# Patient Record
Sex: FEMALE | Race: WHITE | Employment: FULL TIME | URBAN - METROPOLITAN AREA
[De-identification: names, ages, dates, MRNs, and addresses within clinical notes are randomized per-mention and may not be internally consistent; named-entity substitution may affect disease eponyms.]

---

## 2019-01-23 ENCOUNTER — HOSPITAL ENCOUNTER (EMERGENCY)
Facility: CLINIC | Age: 29
Discharge: HOME | End: 2019-01-24
Attending: EMERGENCY MEDICINE | Admitting: EMERGENCY MEDICINE
Payer: COMMERCIAL

## 2019-01-23 DIAGNOSIS — O03.9 INEVITABLE COMPLETE MISCARRIAGE WITHOUT COMPLICATION: ICD-10-CM

## 2019-01-23 LAB
BASOPHILS # BLD AUTO: 0 10E9/L (ref 0–0.2)
BASOPHILS NFR BLD AUTO: 0.2 %
DIFFERENTIAL METHOD BLD: ABNORMAL
EOSINOPHIL # BLD AUTO: 0.2 10E9/L (ref 0–0.7)
EOSINOPHIL NFR BLD AUTO: 1.5 %
ERYTHROCYTE [DISTWIDTH] IN BLOOD BY AUTOMATED COUNT: 12.7 % (ref 10–15)
HCT VFR BLD AUTO: 33 % (ref 35–47)
HGB BLD-MCNC: 11.3 G/DL (ref 11.7–15.7)
IMM GRANULOCYTES # BLD: 0 10E9/L (ref 0–0.4)
IMM GRANULOCYTES NFR BLD: 0.4 %
LYMPHOCYTES # BLD AUTO: 2.7 10E9/L (ref 0.8–5.3)
LYMPHOCYTES NFR BLD AUTO: 25.1 %
MCH RBC QN AUTO: 29.6 PG (ref 26.5–33)
MCHC RBC AUTO-ENTMCNC: 34.2 G/DL (ref 31.5–36.5)
MCV RBC AUTO: 86 FL (ref 78–100)
MONOCYTES # BLD AUTO: 0.7 10E9/L (ref 0–1.3)
MONOCYTES NFR BLD AUTO: 6.7 %
NEUTROPHILS # BLD AUTO: 7.1 10E9/L (ref 1.6–8.3)
NEUTROPHILS NFR BLD AUTO: 66.1 %
NRBC # BLD AUTO: 0 10*3/UL
NRBC BLD AUTO-RTO: 0 /100
PLATELET # BLD AUTO: 173 10E9/L (ref 150–450)
RBC # BLD AUTO: 3.82 10E12/L (ref 3.8–5.2)
WBC # BLD AUTO: 10.8 10E9/L (ref 4–11)

## 2019-01-23 PROCEDURE — 85025 COMPLETE CBC W/AUTO DIFF WBC: CPT | Performed by: EMERGENCY MEDICINE

## 2019-01-23 PROCEDURE — 25000128 H RX IP 250 OP 636: Performed by: EMERGENCY MEDICINE

## 2019-01-23 PROCEDURE — 86850 RBC ANTIBODY SCREEN: CPT | Performed by: EMERGENCY MEDICINE

## 2019-01-23 PROCEDURE — 96360 HYDRATION IV INFUSION INIT: CPT

## 2019-01-23 PROCEDURE — 99284 EMERGENCY DEPT VISIT MOD MDM: CPT | Mod: 25

## 2019-01-23 PROCEDURE — 86901 BLOOD TYPING SEROLOGIC RH(D): CPT | Performed by: EMERGENCY MEDICINE

## 2019-01-23 PROCEDURE — 86900 BLOOD TYPING SEROLOGIC ABO: CPT | Performed by: EMERGENCY MEDICINE

## 2019-01-23 RX ADMIN — SODIUM CHLORIDE 1000 ML: 9 INJECTION, SOLUTION INTRAVENOUS at 23:40

## 2019-01-23 SDOH — HEALTH STABILITY: MENTAL HEALTH: HOW OFTEN DO YOU HAVE A DRINK CONTAINING ALCOHOL?: NEVER

## 2019-01-23 ASSESSMENT — ENCOUNTER SYMPTOMS
FREQUENCY: 0
DYSURIA: 0
LIGHT-HEADEDNESS: 1

## 2019-01-23 ASSESSMENT — MIFFLIN-ST. JEOR: SCORE: 1339.16

## 2019-01-23 NOTE — ED AVS SNAPSHOT
Emergency Department  [address] [place] [address]  Phone:  [phone]  Fax:  [phone]                                    Ulises Meadows   MRN: [id number]    Department:   Emergency Department   Date of Visit:  1/23/2019           After Visit Summary Signature Page    I have received my discharge instructions, and my questions have been answered. I have discussed any challenges I see with this plan with the nurse or doctor.    ..........................................................................................................................................  Patient/Patient Representative Signature      ..........................................................................................................................................  Patient Representative Print Name and Relationship to Patient    ..................................................               ................................................  Date                                   Time    ..........................................................................................................................................  Reviewed by Signature/Title    ...................................................              ..............................................  Date                                               Time          22EPIC Rev 08/18

## 2019-01-24 ENCOUNTER — APPOINTMENT (OUTPATIENT)
Dept: ULTRASOUND IMAGING | Facility: CLINIC | Age: 29
End: 2019-01-24
Payer: COMMERCIAL

## 2019-01-24 VITALS
HEIGHT: 65 IN | BODY MASS INDEX: 22.16 KG/M2 | WEIGHT: 133 LBS | DIASTOLIC BLOOD PRESSURE: 61 MMHG | TEMPERATURE: 98 F | SYSTOLIC BLOOD PRESSURE: 98 MMHG | OXYGEN SATURATION: 98 % | RESPIRATION RATE: 14 BRPM

## 2019-01-24 LAB
ABO + RH BLD: NORMAL
ABO + RH BLD: NORMAL
BLD GP AB SCN SERPL QL: NORMAL
BLOOD BANK CMNT PATIENT-IMP: NORMAL
SPECIMEN EXP DATE BLD: NORMAL

## 2019-01-24 PROCEDURE — 76801 OB US < 14 WKS SINGLE FETUS: CPT

## 2019-01-24 NOTE — DISCHARGE INSTRUCTIONS
He would likely to continue to have some bleeding and may pass large clots and some tissue based on your ultrasound.  You should return to the emergency department if you have severe pain, develop fevers or have significant bleeding, soaking through more than 1 pad per hour for 2 hours or you feel dizzy, lightheaded or short of breath.  He should call your OB provider first thing in the morning to discuss your ED visit and let them know that you were diagnosed with an inevitable, incomplete miscarriage with a gestational sac in the lower uterine segment.  Use warm pack, Tylenol and ibuprofen for comfort.

## 2019-01-24 NOTE — ED PROVIDER NOTES
History     Chief Complaint:  Vaginal Bleeding     HPI   Ulises Meadows is an otherwise healthy 28 year old female, approximately 12 weeks gravid, who presents to the ED for evaluation of vaginal bleeding. The patient reports that she had a 5-week pregnancy that failed to progress and developed the onset of vaginal bleeding last night. This did not become severe until 1630 this afternoon, when the patient states her bleeding increased to the point of her changing her pad every 15 minutes. She notes that she has been passing clots with this. The bleeding is also reportedly worse with standing and is associated with lightheadedness and suprapubic cramping, although the patient denies that this is worse than her usual menstrual cramps. She tried calling her OB/GYN clinic but the clinic was closed due to the late hour, and thus she presented to the ED. Here, the patient denies any dysuria, urgency, or new urinary frequency. Of note, the patient states that [facility] and last had an appointment 2 weeks ago, and she is scheduled to see them in 5 days' time. She denies any history of miscarriage.    Allergies:  Penicillins     Medications:    The patient is currently on no regular medications.     Past Medical History:    History reviewed.  No significant past medical history.      Past Surgical History:    History reviewed. No pertinent past surgical history.     Family History:    History reviewed. No pertinent family history.     Social History:  Marital Status:    Smoking status: No   Alcohol use: No       Review of Systems   Genitourinary: Positive for pelvic pain and vaginal bleeding. Negative for dysuria, frequency and urgency.   Neurological: Positive for light-headedness.   All other systems reviewed and are negative.      Physical Exam     Patient Vitals for the past 24 hrs:   BP Temp Temp src Heart Rate Resp SpO2 Height Weight   01/24/19 0100 98/61 -- -- -- 14 98 % -- --  "  192 -- 98  F (36.7  C) Oral -- -- -- -- --   19 2307 117/80 -- -- 101 16 100 % 1.651 m (5' 5\") 60.3 kg (133 lb)        Physical Exam  Constitutional: Alert, attentive, GCS 15  HENT:    Nose: Nose normal.    Mouth/Throat: Oropharynx is clear, mucous membranes are moist  Eyes: EOM are normal, anicteric, conjugate gaze  CV: regular rate and rhythm; no murmurs  Chest: Effort normal and breath sounds clear without wheezing or rales, symmetric bilaterally   GI:  non tender. No distension. No guarding or rebound.    MSK: No LE edema, no tenderness to palpation of BLE.  Neurological: Alert, attentive, moving all extremities equally.   Skin: Skin is warm and dry.   : Os open. Small amount of thin blood in vaginal vault with likely clot that was easily removed with gentle traction. No clear products of conception.    Emergency Department Course   Imaging:  Radiographic findings were communicated with the patient who voiced understanding of the findings.    US OB w Transvaginal:  1. Probable small gestational sac in the lower uterine  segment/cervical region without a visible yolk sac or fetal pole.  2. Differential includes early pregnancy or fetal demise with  incomplete . Given the low position of this structure this is  probably an  in progress. Ectopic pregnancy cannot be entirely  excluded, but there are no suspicious findings for this.  3. Recommend correlation with serial quantitative beta-hCG levels and  short-term follow-up ultrasound for further evaluation, as per radiology.     Laboratory:  CBC: WBC: 10.8, HGB: 11.3 (L), PLT: 173     ABO/RH type and screen: ABO: B, Rh: Pos, Antibody: Neg    Interventions:   2340 NS 1L IV     Emergency Department Course:  Nursing notes and vitals reviewed. (5191) I performed an exam of the patient as documented above.     IV inserted. Medicine administered as documented above. Blood drawn. This was sent to the lab for further testing, results " above.    (6805) Pelvic exam was performed with female ED nursing staff present in the room.  For details please see exam section above.      The patient was sent for a transvaginal OB ultrasound while in the emergency department, findings above.     (4190) I rechecked the patient and discussed the results of her workup thus far. Her bleeding and her pain had improved.    Findings and plan explained to the Patient. Patient discharged home with instructions regarding supportive care, medications, and reasons to return. The importance of close follow-up was reviewed.   I personally reviewed the laboratory results with the Patient and answered all related questions prior to discharge.         Impression & Plan    Medical Decision Makin-year-old female no reported past medical history who presents for increasing vaginal bleeding with cramping abdominal pain in the setting of expectant miscarriage after she had a 5-week pregnancy that failed to progress and should now be at week 12.  Of note, patient was planning to see her OB/GYN in 4 days if she has not had an spontaneous miscarriage for more definitive management.  She has very mild abdominal tenderness and is passing clots without overt tissue exam with an open eyes consistent with inevitable miscarriage.  I did elect to get an ultrasound to see if things had completed which showed empty GA sac in lower uterine segment c/w incomplete but inevitable  given open cervix.  She was mildly tachycardic, this improved with IV fluids and her hemoglobin is 11.1 with normal platelets.  She is Rh+ with no indication for RhoGam at this time.  Pressure remained stable, patient reported decreased bleeding throughout her stay in the emergency department.  I did review the ultrasound findings and her clinical setting is consistent with an inevitable yet incomplete  at this time.  Given her vital signs are stable and her bleeding was improved, there is no  indication for emergent OB consultation at this time she is safe for expectant management and I recommended she call her OB provider in the morning to discuss her ultrasound findings and check in.  Return precautions were reviewed including signs and symptoms of hemorrhage, increased bleeding soaking through greater than 1 pad per hour for 2 hours or development of fever or severe pain.  Patient and her  expressed displeasure that OB/GYN was not contacted though I reassured them that there is no indication for emergent consultation at this time.    Diagnosis:    ICD-10-CM    1. Inevitable complete miscarriage without complication O03.9    2. IUFD    Disposition:  discharged to home    Scribe Disclosure:  I, Kerry Elder, am serving as a scribe on 1/23/2019 at 11:17 PM to personally document services performed by Rocky Li MD based on my observations and the provider's statements to me.      Kerry Elder  1/23/2019    EMERGENCY DEPARTMENT       Rocky Li MD  01/24/19 3287

## 2019-01-25 ENCOUNTER — HOSPITAL PATHOLOGY (OUTPATIENT)
Dept: OTHER | Facility: CLINIC | Age: 29
End: 2019-01-25

## 2019-01-28 LAB — COPATH REPORT: NORMAL
